# Patient Record
Sex: MALE | Race: WHITE | URBAN - METROPOLITAN AREA
[De-identification: names, ages, dates, MRNs, and addresses within clinical notes are randomized per-mention and may not be internally consistent; named-entity substitution may affect disease eponyms.]

---

## 2018-06-07 ENCOUNTER — BEH TREATMENT PLAN (OUTPATIENT)
Dept: BEHAVIORAL HEALTH | Facility: CLINIC | Age: 43
End: 2018-06-07

## 2018-06-07 ENCOUNTER — HOSPITAL ENCOUNTER (OUTPATIENT)
Dept: BEHAVIORAL HEALTH | Facility: CLINIC | Age: 43
Discharge: HOME | End: 2018-06-07
Attending: SOCIAL WORKER | Admitting: SOCIAL WORKER
Payer: COMMERCIAL

## 2018-06-07 PROCEDURE — 90791 PSYCH DIAGNOSTIC EVALUATION: CPT

## 2018-06-07 ASSESSMENT — ANXIETY QUESTIONNAIRES
1. FEELING NERVOUS, ANXIOUS, OR ON EDGE: SEVERAL DAYS
4. TROUBLE RELAXING: MORE THAN HALF THE DAYS
2. NOT BEING ABLE TO STOP OR CONTROL WORRYING: NOT AT ALL
IF YOU CHECKED OFF ANY PROBLEMS ON THIS QUESTIONNAIRE, HOW DIFFICULT HAVE THESE PROBLEMS MADE IT FOR YOU TO DO YOUR WORK, TAKE CARE OF THINGS AT HOME, OR GET ALONG WITH OTHER PEOPLE: NOT DIFFICULT AT ALL
6. BECOMING EASILY ANNOYED OR IRRITABLE: NOT AT ALL
7. FEELING AFRAID AS IF SOMETHING AWFUL MIGHT HAPPEN: SEVERAL DAYS
3. WORRYING TOO MUCH ABOUT DIFFERENT THINGS: MORE THAN HALF THE DAYS
5. BEING SO RESTLESS THAT IT IS HARD TO SIT STILL: NOT AT ALL
GAD7 TOTAL SCORE: 6

## 2018-06-07 NOTE — PROGRESS NOTES
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 repeated unsuccessful efforts to cut down or stop gambling.  Is often preoccupied with gambling (e.g. having persistent thoughts of reliving past gambling experiences, handicapping or planning the next venture, thinking of ways to get money with which to hat[place]).  After losing money gambling, individual often returned another day to get even. (\"chasing one's losses\")  Lies to conceal the extent of involvement with gambling.  Has jeopardized or lost a significant relationship, job, educational or career opportunity because of gambling.  Relies on others to provide money to relieve desperate financial situations caused by gambling (\"a bailout\")    Specify if:   Episodic:  Meeting diagnostic at more than one time point, with symptoms subsiding between periods of gambling disorder for at least several months.    Persistent:  Experiencing continuous symptoms, to meet diagnostic criteria for multiple years.    Specify if:   In early remission:  After full criteria for alcohol/drug use disorder were previously met, none of the criteria for alcohol/drug use disorder have been met for at least 3 months but for less than 12 months (with the exception that Criterion A4,  Craving or a strong desire or urge to use " alcohol/drug  may be met).     In sustained remission:   After full criteria for alcohol use disorder were previously met, none of the criteria for alcohol/drug use disorder have been met at any time during a period of 12 months or longer (with the exception that Criterion A4,  Craving or strong desire or urge to use alcohol/drug  may be met).   Specify if:   This additional specifier is used if the individual is in an environment where access to alcohol is restricted.    Mild: Presence of 4-5 symptoms    Moderate: Presence of 6-7 symptoms    Severe: Presence of 8 or more symptoms      Summary of Substance Abuse Disorder Symptoms     A problematic pattern of alcohol/drug use leading to clinically significant impairment or distress, as manifested by at least two of the following, occurring within a 12-month period:    NA    Specify if:   In early remission:  After full criteria for alcohol/drug use disorder were previously met, none of the criteria for alcohol/drug use disorder have been met for at least 3 months but for less than 12 months (with the exception that Criterion A4,  Craving or a strong desire or urge to use alcohol/drug  may be met).     In sustained remission:   After full criteria for alcohol use disorder were previously met, none of the criteria for alcohol/drug use disorder have been met at any time during a period of 12 months or longer (with the exception that Criterion A4,  Craving or strong desire or urge to use alcohol/drug  may be met).   Specify if:   This additional specifier is used if the individual is in an environment where access to alcohol is restricted.    Mild: Presence of 2-3 symptoms    Moderate: Presence of 4-5 symptoms    Severe: Presence of 6 or more symptoms    SOGS: 10 DSM-5: 7 CAGE-AID: 0 MARIO-7: 6 PHQ-9: 3     Mental Status Assessment    Physical Appearance/Attire:  Appears stated age  Hygiene:  well groomed  Eye Contact:  at examiner  Speech:  regular  Speech Volume:   regular  Speech Quality: fluid  Cognitive/Perceptual:  reality based  Cognition:  memory intact   Judgment:  intact  Insight:  intact  Orientation:  time, place, person and situation  Thought:  logical   Hallucinations:  none  General Behavioral Tone:  cooperative  Psychomotor Activity:  no problem noted  Gait:  no problem  Mood:  normal  Affect:  congruence/appropriate      Vulnerable Adult Checklist for OUTPATIENTS     1.  Do you have a physical, emotional or mental infirmity or dysfunction?       No    2.  Does this issue impair your ability to provide for your own care without help, including providing yourself with food, shelter, clothing, healthcare or supervision?       No    3.  Because of this issue, I need assistance to protect myself from maltreatment by others.      No    Based on the above information:    This person is not a functional Vulnerable Adult according to [place] Statute 626.5572 subdivision 21.      Category Severity (ICD-10 Code / DSM 5 Code)   Gambling Disorder Severe  (F63.0) (312.31)   Alcohol Use Disorder NA   Cannabis Use Disorder NA   Hallucinogen Use Disorder NA   Inhalant Use Disorder NA   Opioid Use Disorder NA   Sedative, Hypnotic, or Anxiolytic Use Disorder NA   Stimulant Related Disorder NA   Tobacco Use Disorder Severe   (F17.200) (305.1)    Other (or unknown) Substance Use Disorder NA     Suicide Screening Questions:   1. Are you feeling hopeless about the present/future?     No   2. Have you ever had thoughts about taking your life?     No   3. When did you have these thoughts?     NA   4. Do you have any current intent or active desire to take your life?     No   5. Do you have a plan to take your life?     No   6. Have you ever made a suicide attempt?     No   7. Do you have access to pills, guns or other methods to kill yourself?     No     Guide to Risk Ratings   IDEATION: Active thoughts of suicide? INTENT: Intent to follow on suicide? PLAN: Plan to follow through on  "suicide? Level of Risk:   IF Yes Yes Yes Patient = High Emergent   IF Yes Yes No Patient = High Urgent/Non-Emergent   IF Yes No No Patient = Moderate Non-Urgent   IF No No   No Patient = Low Risk   The patient's ADDITIONAL RISK FACTORS and lack of PROTECTIVE FACTORS may increase their overall suicide risk ratings.     Patient's Responses (within the last 30 days)   IDEATION: Active thoughts of suicide?    No     INTENT: Intent to follow on suicide?    No     PLAN: Plan to follow through on suicide?    No     Determining the level of risk depends on the patient responses, suicide risk factors and protective factors.     Additional Risk Factors: A recent loss that was significant to the patient, i.e. loss of job, loss of home, divorce, break-up, etc.  Significant legal problems including being at risk of incarceration   Protective Factors:  Having people in his/her life that would prevent the patient from considering committing suicide (i.e. young children, spouse, parents, etc.)  Having pet(s) who give companionship and/or  would prevent the patient from considering committing suicide  An absence of mental health issues or stable and well treated mental health issues  An absence of chronic health problems or stable and well treated chronic health issues  Having easy access to supportive family members  Having a good community support network  Having cultural, Rastafari or spiritual beliefs that discourage suicide  Having restricted access to highly lethal means of suicide     Risk Status   Emergent? No   Urgent / Non-Emergent? No   Present / Non- Urgent? No    Low Risk? Yes, Evaluation Counselors - Document in Epic / SBAR to counselor \"No identified risk\" and Treatment Counselors - Assess weekly in progress notes under Dimension 3 and summarize in Discharge / Treatment summary under Dimension 3.   Additional information to support suicide risk rating: See Above     Summary of [facility] Placement Criteria:   I.) " "Intoxication and Withdrawal: 0   II.) Biomedical:  0   III.) Emotional and Behavioral:  1   IV.) Readiness to Change:  1   V.) Relapse Potential: 2   VI.) Recovery Environmental: 3     Evaluation Summary and Plan   's Recommendation    Abstain from all forms of gambling, including \"free\" games , and online/gallo games.  Refrain from entering all types of kofi establishments.  Self-ban with the help of a trusted other from all local casinos/card rooms, cut up players cards and have all gambling mail stopped.  Abstain from all mood-altering chemicals unless prescribed by a licensed provider.  Complete the evening outpatient compulsive gambling treatment program at [facility] [address] [facility].   Complete Orientation and begin group on 06/12/18.  Attend GA 12-step, cultural, spiritual, other supportive community meeting on a weekly basis.   Have someone you check in with weekly who will hold you accountable.   Remain law abiding and follow all recommendations of the courts/PO.  Limit, if not abstain from alcohol and all other non-prescribed mood altering chemicals  [facility], complete program, return to [facility].      Initial problem list:    The patient lacks relapse prevention skills  The patient has poor coping skills  The patient has poor refusal skills   The patient has a tendency to isolate  The patient has current legal issues  The patient home is in jeopardy, but they are using long term to pay off back debt.    "

## 2018-06-08 ASSESSMENT — PATIENT HEALTH QUESTIONNAIRE - PHQ9: SUM OF ALL RESPONSES TO PHQ QUESTIONS 1-9: 3

## 2018-06-08 ASSESSMENT — ANXIETY QUESTIONNAIRES: GAD7 TOTAL SCORE: 6

## 2018-06-12 ENCOUNTER — HOSPITAL ENCOUNTER (OUTPATIENT)
Dept: BEHAVIORAL HEALTH | Facility: CLINIC | Age: 43
End: 2018-06-12
Attending: SOCIAL WORKER
Payer: COMMERCIAL

## 2018-06-12 PROCEDURE — 90832 PSYTX W PT 30 MINUTES: CPT

## 2018-06-12 PROCEDURE — 90853 GROUP PSYCHOTHERAPY: CPT

## 2018-06-13 NOTE — PROGRESS NOTES
"6/12/2018 5:30-7:30pm D. Patient participated in his first Intensive Outpatient Problem Gambling group.Patient came in very late and was able to introduce himself to the group.  He stated his work schedule is being changed and he won't be late any longer. Patient's ended up in a discussion of how one group member had a gambling dream and how she had \"thought through\" the process for an hour.  How to handle triggers and cues to decrease chances of relapse were dicussed.  Group member was defensive and felt she had handeld it the right way since she didn't hatfield. Group closed with reading on \"Awe\", DM4.  I. Writer facilitated discussion.  A. Pt seemed to appreciate the discussion of how to handle gambling dreams, urges, and thoughts of gambling. P. Pt. Implement information into recovery and begin working on assignments.     Mora Mak MS, [facility], [facility]  "

## 2018-06-13 NOTE — PROGRESS NOTES
D.  Patient attended Problem Gambling IOP orientation.  Video was watched, assignment notebook was given, and orientation forms were signed. A. Pt understood orientation and motivated to begin treatment.  P.  Begin attending group and  complete assignments.    oMra Mak MS, [facility], [facility]

## 2018-06-18 NOTE — PROGRESS NOTES
[facility]  Adult Gambling Program  Treatment Plan Requirements    These services are provided by the facility for each patient/client according to the individual's treatment plan:    Individual and group counseling    Education    Transition services    Services to address any co-occurring mental illness    Service coordination    Initial Treatment Plan Goals if noted in plan:  1. Complete all the requirements of Program Orientation.  2. Maintain medication compliance throughout the program.  3. Complete gambling therapy for identified issues on your problem list.  4. Gain family involvement in treatment process to address family issues from the problem list.  5. Attend and participate in all required group(s) per individual treatment plan.  6. Focus attention to individualized issues from the treatment plan.  7. Schedule a physical examination if recommended.    In addition to the above, complete all individual goals as specifically outlines on your treatment plan.    Criteria for discharge:  Patients/clients are discharged from the program following completion of the entire program including Phase I and II or acceptance of other post-treatment referrals to mental health providers, or aftercare at other facilities.  Patients/clients may also be discharged for inappropriate behavior, chemical use or gambling.      Favorable Discharge - Patients/clients have completed agreed upon treatment goals, understand their diagnosis and appear motivated about the follow-up care.    Guarded Discharge - Patients/clients have demonstrated some understanding of their diagnosis and recovery process, and have completed some of their treatment goals.  This prognosis also includes patients/clients who have completed some treatment goals but have not made commitment to community support or follow through with referrals.    Unfavorable Discharge - Patients/clients have not completed agreed upon treatment  goals due to their own choice, have limited understanding of their diagnosis, and have shown minimal or inconsistent behavior conducive to recovery.  Those patients/clients discharged due to behavioral problems will also be unfavorable discharges.                                  Adult Gambling Treatment Plan     Name:   Kamlesh Nava  MR#  [id number]  :   1975   43 year old Male    Acute Intoxication/Withdrawal Potential      DIMENSION 1  RISK FACTOR: 0      Assignment Date  Source  Prob/Goal/  Intervention  Target  Date  Initials  Outcome  Completion  Date    2018 Self - Current, History - Current and Assessment - Current   Problem: Pt did not appear to be under the influence or in withdrawal on the date of assessment.  Goal: Limit, if not abstain, from alcohol, and abstain from all other mood altering chemicals due to history of addiction and/or concern of cross addiction.  Intervention: Report to counselor and group any concern or abuse with alcohol or any drug use during treatment.   Handout:  Rethinking Drinking           Ongoing    Orientation SC         Effective    Effective         19     Biomedical Conditions and Complaints      DIMENSION 2  RISK FACTOR: 0       Assignment Date  Source  Prob/Goal/  Intervention  Target  Date  Initials  Outcome  Completion  Date    2018 Self - Current, History - Current and Assessment - Current   Problem: Patient reports no pain or chronic medical conditions.  Goal: Follow recommendations of medical provider.  Intervention: Continue to take prescribed medications and follow-up with medical interventions while in program.        Ongoing  SC       Effective       19     Emotional/Behavioral/Cognitive Conditions and Complications     DIMENSION 3  RISK FACTOR:  1           Assignment Date Source Prob/Goal/  Intervention Target  Date Initials Outcome Completion  Date   2018 Self - Current, History - Current and Assessment -  Current   Problem: Problem: Patient's self-esteem has been negatively impacted due to gambling and its consequences.  Goal: To clarify self-concept.  Recognize how this has affected behaviors and how it can affect recovery.  Intervention:  Handout:  Test of Self-Conscious Affect, Version 3 (TOSCA-3S).  Complete and discuss outcome in group.             In Group SC             Effective             06/28/18 6/18/2018 Self - Current, History - Current and Assessment - Current   Problem: Patient lacks an understanding of gambling as a disease.  Goal:  Gain awareness of gambling as a chronic, progressive, incurable (but treatable) disease.  Intervention:  NAC/Vivitrol/Naltrexone Discussion:  Handout:  Gambling and the  Brain  Movie:  Disease of compulsive gambling           Orientation    In Group SC           Effective    Effective           06/12/18 08/02/18 6/18/2018 Self - Current, History - Current and Assessment - Current   Problem:  Pt reports racing thoughts and brain constantly in action.  Goal:  To learn to self soothe and calm the racing thoughts.  Intervention:  Introduction to Meditation, Mindfulness, yoga, and stress reduction through group meditation, coloring pages, and other coping skills shared in group.  Introduction to yoga, meditation, and mindfulness  Meditation at the beginning of each group session             In group  In group SC             Effective  Effective             02/07/19 02/07/19 6/18/2018 Self - Current, History - Current and Assessment - Current   Problem: Patient struggles with suicidal ideation, past suicide attempts, or is diagnosed with an addiction that has a high rate of suicidal ideation.  Goal:  Increase awareness of signs of growing distress and interventions to decrease suicidal ideation  Complete  Patient Safety Plan   Update  Patient Safety Plan          Intake or   In Group  In Group SC           Effective  Effective           07/22/18 12/12/18 6/18/2018  Self - Current, History - Current and Assessment - Current   Problem: Patient struggles with self-esteem and seeing themselves as worthy of acceptance.  Goal:  Increase acceptance of personality not as a deficit, but that it is who they are and can be accepted as an asset.  Group Work:  Enneagram Type Indicator         Week C SC         Effective         11/29/18     Readiness to Change     DIMENSION 4  RISK FACTOR: 1            Assignment Date Source Prob/Goal/  Intervention Target  Date Initials Outcome Completion  Date   6/18/2018 Self - Current, History - Current and Assessment - Current   Problem: Problem: Lack of understanding the illness of compulsive gambling and life areas impacted.   Goal: To understand the illness of compulsive gambling and to examine all areas affected.   Intervention:  Ongoing: Accept and give feedback to the group on how patient is living a life of recovery and activity/committment to the program.               In Group SC               Effective               10/02/18   6/18/2018 Self - Current, History - Current and Assessment - Current   Problem: Has a combination of internal and external motivation preceding treatment admission.                       Goal:  Increase internal motivation to remain sober.  Be able to utilize external sources on days when it is harder to stay clean for you  Intervention:  Handout:  Setting and Pursuing Goals in Recovery  Ongoing:  Weekly check in sheet               Week C  In group SC               Effective  Effective               08/09/18 02/07/19 6/18/2018 Self - Current, History - Current and Assessment - Current   Problem:  Patient is disconnected from their Humanity, values, and virtues.  Goal:  Decrease negative self-talk, increase sense of purpose in life and their value to society.  Intervention:  Handout:  How to speak the language of Virtues & Virtues list and in group assignment  Group :  Weekly reading on Virtues           In  group    In group SC           Effective    Effective           08/30/18 02/07/19 6/18/2018 Self - Current, History - Current and Assessment - Current   Problem: Poor exercise, diet, and sleep habits.   Goal: Improve physical and psychological health. Interventions: Start an exercise program, Eat 3 nutritional meals daily and Get 6-8 hours of sleep nightly.     Ongoing   SC     Effective       02/07/19       Relapse/Continued Use/Continued Problem Potential     DIMENSION 5  RISK FACTOR: 2               Assignment Date Source Prob/Goal/  Intervention Target  Date Initials Outcome Completion  Date   6/18/2018 Self - Current, History - Current and Assessment - Current   Problem:  Patient has poor relationship with money, large amounts of money gambled, and financial stressors because of gambling.  Goal: Remove all access to money and involvement in financial matters, protect assets.  reduce financial stress and gain insight into money relationship    Intervention:  Handout:  What is your money color?  &  Money Mechanics   Speaker:   Family Means comes to speak     ASAP and ongoing        In Group  In Group SC               Effective  Effective               08/23/18 08/16/18 6/18/2018 Self - Current, History - Current and Assessment - Current   Problem: Patient lacks insight into their personal relapse process.  Goal:  Gain awareness of gambling as a chronic, progressive, incurable (but treatable) disease.  Intervention:  Handout:  What are your strengths and weakness in your recovery plan.  Share with the group.             Week Q SC             Effective             07/19/18 6/18/2018 Self - Current, History - Current and Assessment - Current   Problem:  Patient lacks an understanding of relapse triggers and cues.  Goal:  Gain an understanding of events, places, and other variables can trigger a relapse.  Intervention:  Handout:  Trigger Inventory           Week F SC           Effective           07/12/18      6/18/2018 Self - Current, History - Current and Assessment - Current   Problem:  Patient uses tobacco and alcohol.  Both have shown an increase in relapse if continued use in recovery.  Goal:  Quit drinking and smoking to increase recovery  Intervention:  Create a plan to quit smoking and drinking while in early recovery from problem gambling.     Week T SC         Effective         02/07/19       Recovery Environment     DIMENSION 6  RISK FACTOR: 3                 Assignment Date Source Prob/Goal/  Intervention Target  Date Initials Outcome Completion  Date     6/18/2018 Self - Current, History - Current and Assessment - Current   Problem: Patient lacks a sober support system.  Goal: Develop a strong network of people in recovery, introduction to Gamblers Anonymous, & learn alternatives to GA.  Intervention:  Journal:  Identify GA/support meetings you can attend  Speaker:  GA Member  Handout:  GA book  Movie:  The Anonymous People             In Group  Orientation  In Group SC             Effective  Effective  Effective             08/14/18 06/12/18 07/05/18 6/18/2018 Self - Current, History - Current and Assessment - Current   Problem: Relationships with family/concerned persons have been strained through use and related behaviors  Goal:  Begin healing damaged relationships.  Allow adequate time for healing to occur  Intervention:  Attend Family Group w/wo family           Phase I SC           Effective           02/07/19 6/18/2018 Self - Current, History - Current and Assessment - Current   Problem:  Patient struggles with finding balance in life and handling daily stress.  Goal:  Discover a proper balance between self and life responsibilities  Intervention  Handout:  Life Balance Wheel           Week F SC           Effective           10/11/18   6/18/2018 Self - Current, History - Current and Assessment - Current   Problem:  Patient has history of court involvement.  Goal:  Understand the  correlation between your use and your legal system involvement.    -Remain law abiding so that you may complete probation  Intervention: Satisfy requirements of probation.  Avoid further negative involvement with the criminal justice system.         Ongoing SC         Effective         02/07/19 6/18/2018 Self - Current, History - Current and Assessment - Current   Problem: Patient has suffered the effects of problem gambling  Goal: Complete Treatment with a plan for long term recovery.  Intervention:   Handout:  Planning Aftercare  Continuing Care Plan  for Long Term Recovery       Week V  Discharge SC         Effective         04/11/19       All interventions that are designated as  current  will need to be completed in order to transition out of treatment with a favorable prognosis. The treatment plan is a flexible document and a work in progress. Interventions and goals may be added at any time to customize plan to each individual s needs. Client may work with therapist to change interventions as long as they pertain to the goals stipulated in the plan and/or are clinically driven.

## 2018-06-18 NOTE — PROGRESS NOTES
Acknowledgement of Current Treatment Plan       I have reviewed my treatment plan with my therapist / counselor on __06/19/18________. I agree with the plan as it is written in the electronic health record.    Name Signature   Kamlesh Nava    Name of Therapist / Counselor    Mora Mak MS, [facility], [facility]

## 2018-06-18 NOTE — PROGRESS NOTES
6/18/2018 D) Pt met with Problem Gambling counseling staff to create treatment plan.    Client Treatment goal list:  1. Low - self esteem - been a constant issue for me  2. Truthfulness w/ family -  3. Procrastination  A) Pt signed treatment plan and appeared motivated to begin working on problems identified and discussed. Treatment goals include all Dimensions.   P) Pt to continue in group and working on assignments.  Mora Mak MS, LADC. [facility]

## 2018-06-19 ENCOUNTER — HOSPITAL ENCOUNTER (OUTPATIENT)
Dept: BEHAVIORAL HEALTH | Facility: CLINIC | Age: 43
End: 2018-06-19
Attending: SOCIAL WORKER
Payer: COMMERCIAL

## 2018-06-19 PROCEDURE — 90853 GROUP PSYCHOTHERAPY: CPT

## 2018-06-19 PROCEDURE — 90832 PSYTX W PT 30 MINUTES: CPT

## 2018-06-20 ENCOUNTER — HOSPITAL ENCOUNTER (OUTPATIENT)
Dept: BEHAVIORAL HEALTH | Facility: CLINIC | Age: 43
End: 2018-06-20
Attending: SOCIAL WORKER
Payer: COMMERCIAL

## 2018-06-20 PROCEDURE — 90853 GROUP PSYCHOTHERAPY: CPT

## 2018-06-20 NOTE — PROGRESS NOTES
"6/19/2018 5:30-7:30pm D. Patient participated in the Intensive Outpatient Problem Gambling group. Group participated in a guided meditation, DM3.  Introductions and Check-in were done. Pt shared highs and lows for the week and any struggles with recovery.  Pt shared what brought him to treatment and how the discussion about the other client not being able to have access to money was eye opening for him.  He sees what him and his wife are and will continue to be going through in their relationship and communication. Tuesday group completed the Weekly Group Inventory, DM4, asking about gambling and suicidal ideation during the past week.  Patient did not report suicidal ideation or gambling this week, DM3: No identified risk. Patient did report he is working on better [employer].  Group spent the group processing two issues that came up in check in.  One patient reported gambling and heading to inpatient and one patient who had a fight with his wife over having access to money.  The group members accepted the feedback the group and counselor gave. Group closed with reading on \"Flexibility\", DM4.  I. Writer facilitated discussion.  A. Pt seemed to benefit from hearing others struggles with relationships and communication.  P. Pt. Implement information into recovery and continue working on assignments.    Mora Mak, MS, LADC, ICGC-II  "

## 2018-06-21 ENCOUNTER — HOSPITAL ENCOUNTER (OUTPATIENT)
Dept: BEHAVIORAL HEALTH | Facility: CLINIC | Age: 43
End: 2018-06-21
Attending: SOCIAL WORKER
Payer: COMMERCIAL

## 2018-06-21 PROCEDURE — 90853 GROUP PSYCHOTHERAPY: CPT

## 2018-06-21 NOTE — PROGRESS NOTES
6/20/2018  5:30-7:30  D) Patient attended Family Group, DM6, he had his wife present.  Patient shared that he is grateful that his wife came with him. Group was given a lecture on how the brain changes and addiction and the information was discussed by group.  I) Facilitated group. A)  Patient stated that the information was helpful. Patient seems to be seeking support. P) Invite wife to Family Group.       Abdulaziz Juarez, PAUL

## 2018-06-21 NOTE — PROGRESS NOTES
"6/21/2018 5:30-7:30pm D. Patient participated in the Intensive Outpatient Problem Gambling group. Group participated in a guided meditation, DM3.  Introductions and Check-in were done, new patient started this evening.  Pt shared highs and lows for the week and any struggles with recovery.  Pt shared how he is facing court issues and past felony charges due to his gambling. Group spent the time on check in and helping a group member with her decision to go to inpatient.  New patient also has theft from employer and group shared their stories of their experience thus far with their court process.  Helping to make the newest patient feel welcomed.  Group closed with reading on \"Discernment\", DM4.  I. Writer facilitated discussion.  A. Pt seemed to benefit from sharing their personal stories and hearing the stories of others.  Group is supportive of each other right now and appreciating having the stories of others to reduce their shame. P. Pt. Implement information into recovery and continue working on assignments.     Mora Mak, MS, LADC, ICGC-II  "

## 2018-06-28 ENCOUNTER — HOSPITAL ENCOUNTER (OUTPATIENT)
Dept: BEHAVIORAL HEALTH | Facility: CLINIC | Age: 43
End: 2018-06-28
Attending: SOCIAL WORKER
Payer: COMMERCIAL

## 2018-06-28 PROCEDURE — 90853 GROUP PSYCHOTHERAPY: CPT

## 2018-06-29 NOTE — PROGRESS NOTES
"6/28/2018 5:30-7:30pm D. Patient participated in the Intensive Outpatient Problem Gambling group. Group participated in a guided meditation, DM3.  Introductions and Check-in were done, new group member started this evening.  Pt shared highs and lows for the week and any struggles with recovery.  Pt shared that he lost his job as he believes his step mother reported his past felony to his new employer.  He seemed to be in good spirits about it and is already looking into jobs.  Patient did not have an assignment to present. Group spent the time doing the TOSCA-3S, looking at behaviors that can bring about shame, blame, or guilt.  Discussion was held on the benefit of guilt and how you can make amends quickly rather than sit in your shame. Group closed with reading on \"Integrity\", DM4.  I. Writer facilitated discussion.  A. Pt seemed to gain a better understanding of the difference between guilt, shame, and blame.   P. Pt. Implement information into recovery and continue working on assignments.      Mora Mak MS, LADC, ICGC-II  "

## 2018-07-05 ENCOUNTER — HOSPITAL ENCOUNTER (OUTPATIENT)
Dept: BEHAVIORAL HEALTH | Facility: CLINIC | Age: 43
End: 2018-07-05
Attending: SOCIAL WORKER
Payer: COMMERCIAL

## 2018-07-05 PROCEDURE — 90853 GROUP PSYCHOTHERAPY: CPT

## 2018-07-05 NOTE — PROGRESS NOTES
"7/5/2018 5:30-7:30pm D. Patient participated in the Intensive Outpatient Problem Gambling group. Introductions and Check-in were done.  Pt shared highs and lows for the week and any struggles with recovery.  Group watched the movie \"Anonymous People\" about speaking about addiction in words of recovery.   Small group discussion afterwards.  I. Writer facilitated discussion.  A. Pt seemed to gain an understanding of addiction as a life long disease and options for how to speak about addition and recovery in different terms.  .  P. Pt. Implement information into recovery and continue working on assignments.    Mora Mak, MS, LADC, ICGC-II  "

## 2018-07-11 ENCOUNTER — HOSPITAL ENCOUNTER (OUTPATIENT)
Dept: BEHAVIORAL HEALTH | Facility: CLINIC | Age: 43
End: 2018-07-11
Attending: SOCIAL WORKER
Payer: COMMERCIAL

## 2018-07-11 PROCEDURE — 90853 GROUP PSYCHOTHERAPY: CPT

## 2018-07-12 ENCOUNTER — HOSPITAL ENCOUNTER (OUTPATIENT)
Dept: BEHAVIORAL HEALTH | Facility: CLINIC | Age: 43
End: 2018-07-12
Attending: SOCIAL WORKER
Payer: COMMERCIAL

## 2018-07-12 PROCEDURE — 90853 GROUP PSYCHOTHERAPY: CPT

## 2018-07-12 NOTE — PROGRESS NOTES
7/11/2018  5:30-7:30  D) Patient attended Family Group, DM6, he had his wife present.  Patient shared that their relationship is improving. Group was given a lecture on shame and the information was discussed by group.  I) Facilitated group. A)  Patient stated that the information was helpful. Patient seems to be seeking support. P) Invite wife to Family Group.       Abdulaziz Juarez, PAUL

## 2018-07-12 NOTE — PROGRESS NOTES
"7/12/2018 5:30-7:30pm D. Patient participated in the Intensive Outpatient Problem Gambling group. Group participated in a guided meditation, DM3.  Introductions and Check-in were done.  Pt shared highs and lows for the week and any struggles with recovery.    Patient  completed the Weekly Group Inventory, DM4, asking about gambling and suicidal ideation during the past week.  Patient did not report suicidal ideation or gambling this week, DM3: No identified risk. Patient did report continued job hunting.  Pt presented the assignment \"Trigger Inventory\", DM4.  Patient accepted feedback from peers and group counselor.   Group closed with reading on \"Compassion\", DM4.  I. Writer facilitated discussion.  A. Pt seemed to benefit from hearing assignments and offered feedback.  P. Pt. Implement information into recovery and continue working on assignments.     Mora Mak, MS, [facility], ICGC-II  "

## 2018-07-18 ENCOUNTER — HOSPITAL ENCOUNTER (OUTPATIENT)
Dept: BEHAVIORAL HEALTH | Facility: CLINIC | Age: 43
End: 2018-07-18
Attending: SOCIAL WORKER
Payer: COMMERCIAL

## 2018-07-18 PROCEDURE — 90853 GROUP PSYCHOTHERAPY: CPT

## 2018-07-19 ENCOUNTER — HOSPITAL ENCOUNTER (OUTPATIENT)
Dept: BEHAVIORAL HEALTH | Facility: CLINIC | Age: 43
End: 2018-07-19
Attending: SOCIAL WORKER
Payer: COMMERCIAL

## 2018-07-19 PROCEDURE — 90853 GROUP PSYCHOTHERAPY: CPT

## 2018-07-19 NOTE — PROGRESS NOTES
7/18/2018  5:30-7:30  D) Patient attended Family Group, DM6, he had his wife present.  Patient shared that he got a job. He and his wife are very happy. Group was given a lecture on healthy relationships and the information was discussed by group.  I) Facilitated group. A)  Patient stated that the information was helpful. Patient seems to be seeking support. P) Invite wife to Family Group.       Abdulaziz Juarez, PAUL

## 2018-07-19 NOTE — PROGRESS NOTES
Patient took group off to have dinner with his wife to celebrate his new job.  Used 1 of 2 summer passes.    Mora Mak MS, LADC, ICGC-II

## 2018-07-24 ENCOUNTER — HOSPITAL ENCOUNTER (OUTPATIENT)
Dept: BEHAVIORAL HEALTH | Facility: CLINIC | Age: 43
End: 2018-07-24
Attending: SOCIAL WORKER
Payer: COMMERCIAL

## 2018-07-24 PROCEDURE — 90853 GROUP PSYCHOTHERAPY: CPT

## 2018-07-25 ENCOUNTER — HOSPITAL ENCOUNTER (OUTPATIENT)
Dept: BEHAVIORAL HEALTH | Facility: CLINIC | Age: 43
End: 2018-07-25
Attending: SOCIAL WORKER
Payer: COMMERCIAL

## 2018-07-25 PROCEDURE — 90853 GROUP PSYCHOTHERAPY: CPT

## 2018-07-26 ENCOUNTER — HOSPITAL ENCOUNTER (OUTPATIENT)
Dept: BEHAVIORAL HEALTH | Facility: CLINIC | Age: 43
End: 2018-07-26
Attending: SOCIAL WORKER
Payer: COMMERCIAL

## 2018-07-26 PROCEDURE — 90853 GROUP PSYCHOTHERAPY: CPT

## 2018-07-26 NOTE — PROGRESS NOTES
7/25/2018  5:30-7:30  D) Patient attended Family Group, DM6, he had his wife present.  Patient shared that he is relieved to be working. Group was given a lecture on trust and the information was discussed by group.  I) Facilitated group. A)  Patient stated that the information was helpful. Patient seems to be seeking support. P) Invite wife to Family  Group       Abdulaziz Juarez, PAUL

## 2018-07-27 NOTE — PROGRESS NOTES
"7/24/2018 5:30-7:30pm D. Patient participated in the Intensive Outpatient Problem Gambling group. Group participated in a guided meditation, DM3.  Introductions and Check-in were done, a group member returned from [facility] and the group welcomed her back.  Pt shared highs and lows for the week and any struggles with recovery.  Group completed the \"Enneagram Personality Testing, DM3.  Patient looked personality traits for their number and how we all come with personalities that are different than others.  Knowing that someone else may deal with things differently not due to being wrong, but they see things differently.   Patient did not have an assignment to present but offered feedback to those that presented.   Group closed with reading on \"Confidence\", DM4.  I. Writer facilitated discussion.  A. Pt seemed to be working a plan of recovery, benefiting from giving feedback to peers, and building a strong recovery. P. Pt. Implement information into recovery and continue working on assignments.    Mora Mak, MS, LADC, ICGC-II  "

## 2018-08-01 ENCOUNTER — HOSPITAL ENCOUNTER (OUTPATIENT)
Dept: BEHAVIORAL HEALTH | Facility: CLINIC | Age: 43
End: 2018-08-01
Attending: SOCIAL WORKER
Payer: COMMERCIAL

## 2018-08-01 PROCEDURE — 90853 GROUP PSYCHOTHERAPY: CPT

## 2018-08-02 ENCOUNTER — HOSPITAL ENCOUNTER (OUTPATIENT)
Dept: BEHAVIORAL HEALTH | Facility: CLINIC | Age: 43
End: 2018-08-02
Attending: SOCIAL WORKER
Payer: COMMERCIAL

## 2018-08-02 PROCEDURE — 90853 GROUP PSYCHOTHERAPY: CPT

## 2018-08-02 NOTE — PROGRESS NOTES
8/1/2018  5:30-7:30  D) Patient attended Family Group, DM6, he had  His wife present.  Patient shared that he is happy to be working. Bharati zuniga . Contributing. Group was given a lecture on the signs and symptoms of addiction and the information was discussed by group.  I) Facilitated group. A)  Patient stated that the information was helpful. Patient seems to be seeking support. P) Invite mohan to Family Group.       Abdulaziz Juarez, PAUL

## 2018-08-06 NOTE — PROGRESS NOTES
"8/2/2018 5:30-7:30pm D. Patient participated in the Intensive Outpatient Problem Gambling group. Group participated in a guided meditation, DM3.  Introductions and Check-in were done. Pt shared highs and lows for the week and any struggles with recovery.  Pt shared how life in early recovery is going. Patient completed the Weekly Group Inventory, DM4, asking about gambling and suicidal ideation during the past week.  Patient did not report suicidal ideation or gambling this week, DM3: No identified risk. Patient did report feeling supported by his family and looking forward instead of in the past.  Group watched \"Disease of Compulsive Gambling\", patient stated \"its like they were following me around, at the similarities in his story and the [place] chart being described in the video.  Patients were given a copy of the \"A Chart of Compulsive Gambling and Recovery\" and discussed it briefly after the video.   I. Writer facilitated discussion.  A. Pt seemed to gain in his understanding of compulsive gambling as a disease.  P. Pt. Implement information into recovery and continue working on assignments.     Mora Mak, MS, LADC, ICGC-II  "

## 2018-08-07 ENCOUNTER — HOSPITAL ENCOUNTER (OUTPATIENT)
Dept: BEHAVIORAL HEALTH | Facility: CLINIC | Age: 43
End: 2018-08-07
Attending: SOCIAL WORKER
Payer: COMMERCIAL

## 2018-08-07 PROCEDURE — 90853 GROUP PSYCHOTHERAPY: CPT

## 2018-08-07 NOTE — PROGRESS NOTES
"8/7/2018 5:30-7:30pm D. Patient participated in the Intensive Outpatient Problem Gambling group. Group participated in a guided meditation, DM3.  Introductions and Check-in were done. A group member who was in the program in the spring, but hadn't completed the program, shared how he had been in the program, went to inpatient, then [facility], then gambled for two months.  He shared how his relapse happened long before he gambled as a way to explain to the group how delicate recovery can be.  This patient then shared highs and lows for the week and any struggles with recovery.    Tuesday group completed the Weekly Group Inventory, DM4, asking about gambling and suicidal ideation during the past week.  Patient did not report suicidal ideation or gambling this week, DM3: No identified risk.  Group was read a handout on \"Adulting\" and talked about adulting and how it is a main component of recovery.  Not all of the group members, but most struggle to be responsible for themselves, let alone for the partners or children. Patient was able to see how his life is different in recovery and that he now understands what adulting looks like and he is feeling better about himself.  Group coined out a group member, many sharing what is needed to maintain recovery, while she was a well liked group member, it did not seem that she had actually worked on herself, possibly only changing addictions to shopping and the group seemed aware of that.  Group closed with reading on \"Humanity\", DM4.  I. Writer facilitated discussion.  A. Pt seemed to appreciate the patient that was coining out, and gave thoughtful consideration to adulting.  P. Pt. Implement information into recovery and continue working on assignments.       Mora Mak, MS, [facility], ICGC-II  "

## 2018-08-08 ENCOUNTER — HOSPITAL ENCOUNTER (OUTPATIENT)
Dept: BEHAVIORAL HEALTH | Facility: CLINIC | Age: 43
End: 2018-08-08
Attending: SOCIAL WORKER
Payer: COMMERCIAL

## 2018-08-08 PROCEDURE — 90853 GROUP PSYCHOTHERAPY: CPT

## 2018-08-08 NOTE — PROGRESS NOTES
8/8/2018  5:30-7:30  D) Patient attended Family Group, DM6, he had his wife present.  Patient shared that he has not felt the need to go to meetings or use his phone list.  [place] feedback that he needs to practice his recovery behaviors before he needs them. Group was given a lecture on bonding andconnection and the information was discussed by group.  I) Facilitated group. A)  Patient stated that the information was helpful. Patient seems to be seeking support. P) Invite wife to Family Group.     PAUL Steve

## 2018-08-09 ENCOUNTER — HOSPITAL ENCOUNTER (OUTPATIENT)
Dept: BEHAVIORAL HEALTH | Facility: CLINIC | Age: 43
End: 2018-08-09
Attending: SOCIAL WORKER
Payer: COMMERCIAL

## 2018-08-09 PROCEDURE — 90853 GROUP PSYCHOTHERAPY: CPT

## 2018-08-10 NOTE — PROGRESS NOTES
"8/9/2018 5:30-7:30pm D. Patient participated in the Intensive Outpatient Problem Gambling group. Group participated in a guided meditation, DM3.  Introductions and Check-in were done.  Pt shared highs and lows for the week and any struggles with recovery.    Pt presented the assignment \"Setting and pursuing Goals in Recovery\", sharing how he is changing his view on money, exercising regularly, and procrastination. Patient accepted and  received feedback from peers and counselor. Conversation was held on Facebook and phone time at night, Value of money, stages of change, and  Accelerated Resolution Therapy.  Group closed with reading on \"Steadfastness\", DM4.  I. Writer facilitated discussion.  A. Pt seemed to gain from the discussions on stages of change, setting SMART goals, and understanding the value of money.  P. Pt. Implement information into recovery and continue working on assignments.     Mora Mak, MS, LADC, ICGC-II  "

## 2018-08-14 ENCOUNTER — HOSPITAL ENCOUNTER (OUTPATIENT)
Dept: BEHAVIORAL HEALTH | Facility: CLINIC | Age: 43
End: 2018-08-14
Attending: SOCIAL WORKER
Payer: COMMERCIAL

## 2018-08-14 PROCEDURE — 90853 GROUP PSYCHOTHERAPY: CPT

## 2018-08-14 NOTE — PROGRESS NOTES
"8/14/2018 5:30-7:30pm D. Patient participated in the Intensive Outpatient Problem Gambling group. Group participated in a guided meditation, DM3.  Group did quick introductions as Giancarlo a speaker from [employer] was there to tell his story of recovery.  Full introductions and Check-in were done, new group member started this evening.  Pt shared highs and lows for the week and any struggles with recovery.  Pt shared  About his upcoming court and a story about his wife. Tuesday group completed the Weekly Group Inventory, DM4, asking about gambling and suicidal ideation during the past week.  Patient did not report suicidal ideation or gambling this week, DM3: No identified risk.  The remainder of the group was spent with check in as there was a relapse, some returning group members, a new court case, and another consequence due to court.  Group listened and supported group members with their difficulties. Group closed with reading on \"Righteousness\", DM4.  I. Writer facilitated discussion.  A. Pt seemed to benefit from sharing their story and hearing the stories from other group members. P. Pt. Implement information into recovery and continue working on assignments. Group was reminded to try to check in and support each other.     Mora Mak, MS, [facility], ICGC-II  "

## 2018-08-15 ENCOUNTER — HOSPITAL ENCOUNTER (OUTPATIENT)
Dept: BEHAVIORAL HEALTH | Facility: CLINIC | Age: 43
End: 2018-08-15
Attending: SOCIAL WORKER
Payer: COMMERCIAL

## 2018-08-15 PROCEDURE — 90853 GROUP PSYCHOTHERAPY: CPT

## 2018-08-15 NOTE — PROGRESS NOTES
Emailed letter to patient at his request documenting his progression in IOP.    Mora Mak MS, LADC, ICGC-II

## 2018-08-15 NOTE — LETTER
[address] [address]      8/15/2018      Kamlesh Nava  [address] [place] [id number]      Dear Mr. Nava    This is to verify that Kamlesh Nava (1975) was here for an evaluation on 06/07/2018.    Recommendation:  Intensive Outpatient Problem Gambling Program     Mr. Nava completed the assessment on 06/07/2018.  He was recommended to attend the  Intensive Outpatient Problem Gambling Group Program at [facility].  Mr. Nava attended orientation and began Phase I of the [facility] Treatment for Problem Gambling on 06/12/18.   is nearing the end of Phase I and will continue into Phase II.    Phase I, 24 sessions:  Tuesday, Wednesday, & Thursday 5:30pm - 7:30pm  Phase II, 20 sessions:  Tuesday & Thursday 5:30pm - 7:30pm    Mr. Nava has been an active and responsible group member.  He has missed a few groups since beginning the program, mainly due to job training.  Mr. Nava and his wife are both involved in the family program on Wednesday evenings.  He has a treatment plan listing the goals and assignments needed to complete the program with staff approval, he is working on those assignments and presenting them in group.    If we can be of further service, please don't hesitate to call.    Sincerely,        Mora Mak MS, [facility], ICGC-II  State Approved Problem Gambling Treatment Provider  [phone]

## 2018-08-16 ENCOUNTER — HOSPITAL ENCOUNTER (OUTPATIENT)
Dept: BEHAVIORAL HEALTH | Facility: CLINIC | Age: 43
End: 2018-08-16
Attending: SOCIAL WORKER
Payer: COMMERCIAL

## 2018-08-16 PROCEDURE — 90853 GROUP PSYCHOTHERAPY: CPT

## 2018-08-16 NOTE — PROGRESS NOTES
8/15/2018  5:30-7:30  D) Patient attended Family Group, DM6, he had his wife present.  Patient shared that everything continues to go well. Group was given a lecture on the brain and the information was discussed by group.  I) Facilitated group. A)  Patient stated that the information was helpful. Patient seems to be seeking support. P) Invite wife to Family Group.       Abdulaziz Juarez, PAUL

## 2018-08-17 NOTE — PROGRESS NOTES
"8/16/2018 5:30-7:30pm D. Patient participated in the Intensive Outpatient Problem Gambling group.  Group was given the opportunity to invite family to watch the movie \"Big Abhinav\", on the progression of problem gambling, followed by a presentation by \"[employer]\", on credit counseling and the programs they offer.  Patient had his wife Minoo present. Family members were given a list of behaviors that were presented in the movie that they may recognize in their loved ones.  After the movie brief introductions and Check-in were done.  Everyone shared highs and lows for the week.  Discussion was held after the movie and then everyone went back to the group room to meet with [employer].   KAYY Writer facilitated discussion.  A. Patient and family member seemed to benefit from watching the movie as well as listening to [employer].  Patients were able to stay after and discuss specifics with the presenter.  Patients were given out additional parking passes. P. Pt. Implement information into recovery and continue working on assignments.     Mora Mak MS, LADC, ICGC-II  "

## 2018-08-23 ENCOUNTER — HOSPITAL ENCOUNTER (OUTPATIENT)
Dept: BEHAVIORAL HEALTH | Facility: CLINIC | Age: 43
End: 2018-08-23
Attending: SOCIAL WORKER
Payer: COMMERCIAL

## 2018-08-23 PROCEDURE — 90853 GROUP PSYCHOTHERAPY: CPT

## 2018-08-23 NOTE — PROGRESS NOTES
"8/23/2018 5:30-7:30pm D. Patient participated in the Intensive Outpatient Problem Gambling group. Group participated in a guided meditation, DM3.  Introductions and Check-in were done, new group member started this evening.  Pt shared highs and lows for the week and any struggles with recovery.  Pt shared that court was continued until after he finishes treatment, he had a day off on Friday and had urges for the first time.  He was going to present relapse prevention assignment, but it was suggested that he redo it now that he has had an urge.  He is also setting up plans for his Fridays home from work by himself.  Drinking was again discussed as he continues to drink, his father was an alcoholic, and his wife is concerned.  Patient wasn't present Tuesday so he completed the Weekly Group Inventory, DM4, asking about gambling and suicidal ideation during the past week.  Patient did not report suicidal ideation or gambling this week, DM3: No identified risk.  Some group members had assignments to present but they were not complete.  Group instead worked on \"The Color of Money\", asking simple questions to see how they view money.  The group was split between Red and Blue.  Reds are seen as having good financial skills, but not always\"check on things once they are in place\".  Blues see money as for \"pampering or treating self\".  Patient was a Blue.  There was a second section on \"Money Mechanics\" about talking within families about money.  Some of the patients took an extra copy home to discuss with partners.  Group closed with reading on \"Self Discipline\", DM4.  I. Writer facilitated discussion.  A. Pt seemed benefit from looking at their relationship with money.   P. Pt. Implement information into recovery and continue working on assignments.     Mora Mak, MS, [facility], ICGC-II  "

## 2018-08-30 ENCOUNTER — HOSPITAL ENCOUNTER (OUTPATIENT)
Dept: BEHAVIORAL HEALTH | Facility: CLINIC | Age: 43
End: 2018-08-30
Attending: SOCIAL WORKER
Payer: COMMERCIAL

## 2018-08-30 PROCEDURE — 90853 GROUP PSYCHOTHERAPY: CPT

## 2018-08-31 NOTE — PROGRESS NOTES
"8/30/2018 5:30-7:30pm D. Patient participated in the Intensive Outpatient Problem Gambling group. Group participated in a guided meditation, DM3.  Introductions and Check-in were done. Pt shared highs and lows for the week and any struggles with recovery.  Pt shared how he came into a lot of cash and how he didn't think about gambling and immediately called his wife about it.  She wanted proof of the amount received, but he couldn't do that and she understood and he understood her questioning him.  Group member missed Tuesday group so completed the Weekly Group Inventory, DM4, asking about gambling and suicidal ideation during the past week.  Patient did not report suicidal ideation or gambling this week, DM3: No identified risk. Patient did report his financial issues.  Patient did not have an assignment to present but offered feedback to group members who presented their assignments.  Group went over the \"Virtues Project\" talking about working towards strengthening our virtues rather than looking at our character defects.  Group closed with reading on \"Joyfulness\", DM4.  I. Writer facilitated discussion.  A. Pt seemed to appreciate looking at increasing the positive (virtues) versus decreasing our negatives (character defects). P. Pt. Implement information into recovery and continue working on assignments.     Mora Mak MS, LADC, ICGC-II  "

## 2018-09-11 ENCOUNTER — HOSPITAL ENCOUNTER (OUTPATIENT)
Dept: BEHAVIORAL HEALTH | Facility: CLINIC | Age: 43
End: 2018-09-11
Attending: SOCIAL WORKER
Payer: COMMERCIAL

## 2018-09-11 PROCEDURE — 90853 GROUP PSYCHOTHERAPY: CPT

## 2018-09-12 NOTE — PROGRESS NOTES
"9/11/2018 5:30-7:30pm D. Patient participated in the Intensive Outpatient Problem Gambling group. Group participated in a guided meditation, DM3.  Introductions and Check-in were done, new group member started this evening.  Pt shared highs and lows for the week and any struggles with recovery.  Pt shared constant stress from his wife who is upset about his mothers visit. Speaker from [place] did not show as expected.   Tuesday group completed the Weekly Group Inventory, DM4, asking about gambling and suicidal ideation during the past week.  Patient did not report suicidal ideation or gambling this week, DM3: No identified risk. Patient did report urges to [place] while watching football.  Patient did not have an assignment ready to present.  Patient did give feedback on one presented assignment. Group closed with reading on \"Tact\", DM4.  I. Writer facilitated discussion.  A. Pt seemed to gain understanding in relapse prevention, how to strengthen recovery, and moderation. P. Pt. Implement information into recovery and continue working on assignments.     Mora Mak MS, [facility], ICGC-II  "

## 2018-09-13 ENCOUNTER — HOSPITAL ENCOUNTER (OUTPATIENT)
Dept: BEHAVIORAL HEALTH | Facility: CLINIC | Age: 43
End: 2018-09-13
Attending: SOCIAL WORKER
Payer: COMMERCIAL

## 2018-09-13 PROCEDURE — 90853 GROUP PSYCHOTHERAPY: CPT

## 2018-09-13 NOTE — PROGRESS NOTES
"9/13/2018 5:30-7:30pm D. Patient participated in the Intensive Outpatient Problem Gambling group. Group participated in a guided meditation, DM3.  Introductions and Check-in were done.  Pt shared highs and lows for the week and any struggles with recovery.  Pt shared that he finally caught up on his mortgage and he was proud at how he is doing financially.  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  Patient did not have an assignment to present but offered feedback to another group member who presented his assignment.  Discussion on trying to control cigarette smoking doesn't work just as trying to control gambling didn't work. Group wrote Thank You cards to the [employer] to thank them for the parking passes.  They were given the option to take extra cards and send a card to someone who has helped them with their treatment. Group member completing did not want to have a chance to hear from his group members, but one woman did sing a song for him.  Group closed with reading on \"Contentment\", DM4.  I. Writer facilitated discussion.  A. Pt seemed to appreciate the chance to send a thank you card, and learned more about creating goals in recovery. P. Pt. Implement information into recovery and continue working on assignments.     Mora Mak MS, [facility], ICGC-II  "

## 2018-09-20 ENCOUNTER — HOSPITAL ENCOUNTER (OUTPATIENT)
Dept: BEHAVIORAL HEALTH | Facility: CLINIC | Age: 43
End: 2018-09-20
Attending: SOCIAL WORKER
Payer: COMMERCIAL

## 2018-09-20 PROCEDURE — 90853 GROUP PSYCHOTHERAPY: CPT

## 2018-09-20 NOTE — PROGRESS NOTES
"9/20/2018 5:30-7:30pm D. Patient participated in the Intensive Outpatient Problem Gambling group. Group participated in a guided meditation, DM3.  Introductions and Check-in were done, new group member started this evening.  Pt shared highs and lows for the week and any struggles with recovery.  Pt shared continuing struggles with his wife. Patient  completed the Weekly Group Inventory, DM4, asking about gambling and suicidal ideation during the past week.  Patient did not report suicidal ideation or gambling this week, DM3: No identified risk. Patient did report sharing with his co-worker about his gambling addiction to explain why he couldn't be involved in the football pool.  Patient did not have an assignment to present but engaged in conversation regarding one patient's assignment.  Group spent time discussing the difference between changing a story and lying.    Group closed with reading on \"Generosity\", DM4.  I. Writer facilitated discussion.  A. Pt seemed frustrated by presenter over his resistance to see how his story was changing the longer he shared with group.  Patient was able to relate to how their own stories changed when they look back at their time gambling.  P. Pt. Implement information into recovery and continue working on assignments. Consider GA and GamAnon.     Mora Mak, MS, LADC, ICGC-II  "

## 2018-10-04 ENCOUNTER — HOSPITAL ENCOUNTER (OUTPATIENT)
Dept: BEHAVIORAL HEALTH | Facility: CLINIC | Age: 43
End: 2018-10-04
Attending: SOCIAL WORKER
Payer: COMMERCIAL

## 2018-10-04 PROCEDURE — 90853 GROUP PSYCHOTHERAPY: CPT

## 2018-10-05 NOTE — PROGRESS NOTES
"10/4/2018 5:30-7:30pm D. Patient participated in the Intensive Outpatient Problem Gambling group. Group participated in a guided meditation, DM3.  Introductions and Check-in were done, new group member started this evening.  Pt shared highs and lows for the week and any struggles with recovery.  Pt shared that he continues to struggle with his relationship and was recommended to contact Darron Alvares for marriage counseling as he works with problem gamblers.  Patient wasn't present for Tuesday's group so he completed the Weekly Group Inventory, DM4, asking about gambling and suicidal ideation during the past week.  Patient did not report suicidal ideation or gambling this week, DM3: No identified risk. Patient did report being pressured with football pools at work.  Group continued the conversation from the prior group on how they feel they are doing (on a scale of 1-10) towards their recovery and how the group feels they are doing.  Patient shared their rating and what they are doing and what they would need to do to bring them up one level higher.  Patient shared he is trying to get to group more consistantly.  Group closed with reading on \"Cleanliness\", DM4.  I. Writer facilitated discussion.  A. Pt seemed to benefit from the group conversation and sharing with group members.  Patient increased awareness of behaviors that are supportive of recovery and a chance to look at the areas they could strengthen. P. Pt. Implement information into recovery and continue working on assignments.    Mora Mak MS, [facility], ICGC-II  "

## 2018-10-11 ENCOUNTER — HOSPITAL ENCOUNTER (OUTPATIENT)
Dept: BEHAVIORAL HEALTH | Facility: CLINIC | Age: 43
End: 2018-10-11
Attending: SOCIAL WORKER
Payer: COMMERCIAL

## 2018-10-11 PROCEDURE — 90853 GROUP PSYCHOTHERAPY: CPT

## 2018-10-11 NOTE — PROGRESS NOTES
"10/11/2018 5:30-7:30pm D. Patient participated in the Intensive Outpatient Problem Gambling group. Group participated in a guided meditation, DM3.  Introductions and Check-in were done. Pt shared highs and lows for the week and any struggles with recovery.  Pt shared that he is going to visit family for a few days. Patient was absent for Tuesdays group so completed the Weekly Group Inventory, DM4, asking about gambling and suicidal ideation during the past week.  Patient did not report suicidal ideation or gambling this week, DM3: No identified risk. Patient did report concerns about finances.  Group completed \"The Life Balance Wheel\" and then shared where they were strong and where they were lacking balance.  Discussion was held on the importance of balance and what they could be doing to bring more balance.  It was agreed that both finance and recovery will be off balance for the first year, but group did agree that they are doing better than they were two months ago.   Group closed with reading on \"Sacrifice\", DM4.  I. Writer facilitated discussion.  A. Pt seemed to benefit from the conversation on balance.   P. Pt. Implement information into recovery and continue working on assignments.  Complete the life balance homework packet.     Mora Mak, MS, [facility], ICGC-II  "

## 2018-10-19 NOTE — PROGRESS NOTES
Patient requested a letter for the court be drafted and emailed to his , release on file.  Letter and release emailed to patient and  using secure email.    Mora Mak MS, [facility], ICGC-II

## 2018-10-19 NOTE — LETTER
[address] [address]      10/19/2018       To Whom It May Concern:    This is to verify that Mr. Kamlesh Nava, : 1975, came to [facility] for a Problem Gambling Assessment.    Mr. Nava completed the assessment on 18.  The recommendation was for him to attend the Intensive Outpatient Problem Gambling Group Program at [facility].  On 18 he completed orientation and began attending group that evening.  Mr. Nava has completed 24 of 24 Phase I group sessions and 4 of 20 Phase II group sessions as of the day of this letter.    Phase I, 24 sessions:  Tuesday, Wednesday, & Thursday 5:30pm - 7:30pm  Phase II, 20 sessions:  Tuesday & Thursday 5:30pm - 7:30pm    Mr. Nava and his wife were both involved in the family program on Wednesday evenings.  He has been an active member of group sharing assignments and accepting feedback.  He has completed many of his treatment goals and at this time the plan is to discharge him with a favorable outcome after he completes the last 16 sessions of Phase II.    If we can be of further service, please don't hesitate to contact me.    Sincerely,        Mora Mak MS, [facility], ICGC-II  [facility] Approved Problem Gambling Treatment Provider  [phone]

## 2018-11-08 ENCOUNTER — HOSPITAL ENCOUNTER (OUTPATIENT)
Dept: BEHAVIORAL HEALTH | Facility: CLINIC | Age: 43
End: 2018-11-08
Attending: SOCIAL WORKER
Payer: COMMERCIAL

## 2018-11-08 PROCEDURE — 90853 GROUP PSYCHOTHERAPY: CPT

## 2018-11-09 NOTE — PROGRESS NOTES
"11/8/2018 5:30-7:30pm D. Patient participated in the Intensive Outpatient Problem Gambling group. Group participated in a guided meditation, DM3.  Introductions and Check-in were done.   Pt shared highs and lows for the week and any struggles with recovery.  Pt shared that things are going well with wife as long as he does not work too many hours. Patient's who missed Tuesday group completed the Weekly Group Inventory, DM4, asking about gambling and suicidal ideation during the past week.  Patient did not report suicidal ideation or gambling this week, DM3: No identified risk. Patient did report that he continues to attend GA and contact his GA sponsor consistently.  Patient did not have an assignment to present but offered feedback on other's assignments that were presented.   Group closed with reading on \"Steadfastness\", DM4.  I. Writer facilitated discussion.  A. Pt seemed to benefit and gain in understanding about triggers in early and later recovery. P. Pt. Implement information into recovery and continue working on assignments.     Mora Mak MS, LADC, ICGC-II  "

## 2018-11-29 ENCOUNTER — HOSPITAL ENCOUNTER (OUTPATIENT)
Dept: BEHAVIORAL HEALTH | Facility: CLINIC | Age: 43
End: 2018-11-29
Attending: SOCIAL WORKER
Payer: COMMERCIAL

## 2018-11-29 PROCEDURE — 90853 GROUP PSYCHOTHERAPY: CPT

## 2018-11-29 NOTE — PROGRESS NOTES
11/29/2018 Weekly Staffing Note with Supervisor PAUL Rodas, LICSW.  Met for case consult on patient.  Discussed new and ongoing concerns and progress in treatment.  Patient is struggling with attendance, issue was addressed and ideas for change in treatment planning was discussed.  Plan is to talk to patient for commitment or recommendation to other programming.  Patient will be staffed with Family Counselor this afternoon.    Mora Mak MS, PAUL, ICGC-II

## 2018-11-30 NOTE — PROGRESS NOTES
"11/29/2018 5:30-7:30pm D. Patient participated in the Intensive Outpatient Problem Gambling group. Group participated in a guided meditation, DM3.  Introductions and Check-in were done.  Pt shared highs and lows for the week and any struggles with recovery.  Pt shared problems with neighbors. Patient completed the Weekly Group Inventory, DM4, asking about gambling and suicidal ideation during the past week.  Patient did not report suicidal ideation or gambling this week, DM3: No identified risk. Patient did report being in contact with his sponsor.  Group completed the \"Enneagram Type Indicator Sample\" looking at how our personalities are not wrong or better, but just to try to be the best person we can be.  This helps with increasing self-esteem and in close relationships with others.  Group closed with reading on \"Ebony\", DM4.  I. Writer facilitated discussion.  A. Pt seemed to benefit from learning their type and how there are strong traits that are beneficial to their life. P. Pt. Implement information into recovery and continue working on assignments.     Mora Mak MS, LADC, ICGC-II  "

## 2018-12-10 PROBLEM — F63.0 PATHOLOGICAL GAMBLING: Status: ACTIVE | Noted: 2018-12-10

## 2018-12-27 ENCOUNTER — HOSPITAL ENCOUNTER (OUTPATIENT)
Dept: BEHAVIORAL HEALTH | Facility: CLINIC | Age: 43
End: 2018-12-27
Attending: SOCIAL WORKER
Payer: COMMERCIAL

## 2018-12-27 PROCEDURE — 90853 GROUP PSYCHOTHERAPY: CPT

## 2018-12-28 NOTE — PROGRESS NOTES
"12/27/2018 5:30-7:30pm D. Patient participated in the Intensive Outpatient Problem Gambling group. Group participated in a guided meditation, DM3.  Introductions and Check-in were done, new group members started this evening.  Pt shared highs and lows for the week and any struggles with recovery.  Pt shared having been to court and now just having restitution and probation, with no USP time. Patient will generally only come on Thursdays as he is struggling to get out of work on Tuesdays, he will also be encouraged to attend family with his wife, as she seems to benefit from the family group. Group completed the Weekly Group Inventory, DM4, asking about gambling and suicidal ideation during the past week.  Patient did not report suicidal ideation or gambling this week, DM3: No identified risk. Patient did report going to his mothers for the holiday.  Group used entire group time for check in and patient was given a \"Patient Safety Plan\" to complete. There was much discussion on the benefits of Naltrexone and yet there is resistance from many group members to try it. Group closed with reading on \"kindness\", DM4.  I. Writer facilitated discussion.  A. Pt seemed to be seeking support from peers. P. Pt. Implement information into recovery and continue working on assignments.     Mora Mak MS, LADC, ICGC-II  "

## 2019-01-03 ENCOUNTER — HOSPITAL ENCOUNTER (OUTPATIENT)
Dept: BEHAVIORAL HEALTH | Facility: CLINIC | Age: 44
End: 2019-01-03
Attending: SOCIAL WORKER
Payer: COMMERCIAL

## 2019-01-03 PROCEDURE — 90853 GROUP PSYCHOTHERAPY: CPT

## 2019-01-04 NOTE — PROGRESS NOTES
"1/3/2019 5:30-7:30pm D. Patient participated in the Intensive Outpatient Problem Gambling group. Group participated in a guided meditation, DM3.  Introductions and Check-in were done. Pt shared highs and lows for the week and any struggles with recovery.  Pt shared that he father had reached out to him after 8 years. Due to the holiday, there was no group on Tuesday so the group completed the Weekly Group Inventory, DM4, asking about gambling and suicidal ideation during the past week.  Patient did not report suicidal ideation or gambling this week, DM3: No identified risk. Patient did report being offered a promotion but may not take it as is it is more stress.  Patient did not have an assignment to present.  Group spent the time checking in and sharing how things are going regarding issues that arose over the holidays. Group closed with reading on \"Empathy\", DM4.  I. Counselor taught coping skills and educated patients on strategies to increase recovery,DM 3, and facilitated discussion. Patient encouraged him and his wife to attend the GA/GamAnon on Friday nights where many group members attend.  The couple is very isolated.  A. Pt seemed to benefit from the discussion and offered feedback to peers who were struggling.  P. Pt. Implement information into recovery and continue working on assignments. Ongoing assignments will be presented in the following group sessions.     Mora Mak MS, LADC, ICGC-II  "

## 2019-01-17 ENCOUNTER — HOSPITAL ENCOUNTER (OUTPATIENT)
Dept: BEHAVIORAL HEALTH | Facility: CLINIC | Age: 44
End: 2019-01-17
Attending: SOCIAL WORKER
Payer: COMMERCIAL

## 2019-01-17 PROCEDURE — 90853 GROUP PSYCHOTHERAPY: CPT

## 2019-01-17 NOTE — LETTER
[address] [place] [address]      1/21/2019      Kamlesh Nava  [address] [place] [id number]      To Whom It May Concern:    This is to verify that Mr. Nava came to the [facility] for a Problem Gambling Assessment on 06/07/18.    Recommendation:  Intensive Outpatient Problem Gambling Program     Mr. Nava completed the assessment on 06/07/18.  He was recommended to attend the Intensive Outpatient Problem Gambling Group Program at [facility].   As of the day of this letter. Mr. Nava has completed 24 of 24 sessions of Phase I and 9/20 of Phase II.  Patient has shown interested in continuing in the peer led phase III group.    Phase I, 24 sessions:  Tuesday, Wednesday, & Thursday 5:30pm - 7:30pm  Phase II, 20 sessions:  Tuesday & Thursday 5:30pm - 7:30pm  Patients are able to stay for Phase III, if patient and counselor agree it would continue to benefit long term recovery.    Mr. Nava and his wife were both involved in the family program on Wednesday evenings.  He has struggled to attend group consistently due to his work schedule, but when present is an active member of the group.  Patient is working on assignments towards his treatment goals.    If we can be of further service, please don't hesitate to call.    Sincerely,        Mora Mak, MS, [facility], ICGC-II  State Approved Problem Gambling Treatment Provider  [phone]

## 2019-01-18 NOTE — PROGRESS NOTES
5:30:30 -7:30PM D. Pt. participated in the Intensive Outpatient Problem Gambling group. Patient participated in a guided meditation. Introductions and Check-in were done. Pt. Shared highs and lows for the week and any struggles with recovery. .Pt. did not report suicidal ideation or gambling this week. I. Writer facilitated discussion. A. Pt.seemed to be working on a plan of recovery. P. Pt. To implement information into recovery and continue working on assignments. Nate Mistry [facility], [facility]

## 2019-02-07 ENCOUNTER — HOSPITAL ENCOUNTER (OUTPATIENT)
Dept: BEHAVIORAL HEALTH | Facility: CLINIC | Age: 44
End: 2019-02-07
Attending: SOCIAL WORKER
Payer: COMMERCIAL

## 2019-02-07 PROCEDURE — 90853 GROUP PSYCHOTHERAPY: CPT

## 2019-02-08 NOTE — PROGRESS NOTES
"2/7/2019 5:30-7:30pm D. Patient participated in the Intensive Outpatient Problem Gambling group. Group participated in a guided meditation, DM3.  Introductions and Check-in were done,.  Pt shared highs and lows for the week and any struggles with recovery.  Pt shared that he is going on a vacation with his wife and has enjoyed the planning. Patient was able to share his story of long term recovery and offer support to the new members. Tuesday group completed the Weekly Group Inventory, DM4, asking about gambling and suicidal ideation during the past week.  Patient did not report suicidal ideation or gambling this week, DM3: No identified risk. Patient did report continued worries about finances.  Patient gave feedback on others presenting their assignments on triggers.   Group closed with reading on \"Service\", DM4.  I. Counselor taught coping skills and educated patients on Triggers and coping mechanisms,DM 3, and facilitated discussion.  A. Pt seemed to gain in knowledge of triggers.  P. Pt. Implement information into recovery and continue working on assignments.     Mora Mak MS, [facility], ICGC-II      "

## 2019-04-18 ENCOUNTER — HOSPITAL ENCOUNTER (OUTPATIENT)
Dept: BEHAVIORAL HEALTH | Facility: CLINIC | Age: 44
End: 2019-04-18
Attending: SOCIAL WORKER
Payer: COMMERCIAL

## 2019-04-18 PROCEDURE — 90853 GROUP PSYCHOTHERAPY: CPT

## 2019-05-14 NOTE — DISCHARGE SUMMARY
PROBLEM GAMBLING DISCHARGE SUMMARY    NAME:   Kamlesh Nava    MRN#  [id number]    YOB: 1975    PROGRAM:  Evening Intensive Outpatient Compulsive Gambling Treatment Program    ADMISSION DATE:  19  DISCHARGE DATE:  19    ADMISSION DIAGNOSIS:  Compulsive Gambling 312.31, Severe  DISCHARGE DIAGNOSIS:  Compulsive Gambling 312.31, Sustained remission    DISCHARGE STATUS:    Mr. Kamlesh Nava was discharged from the compulsive gambling program at the [facility] on 19 with a favorable discharge. Mr. Nava completed Phase I and Phase II.  Mr. Nava has completed agreed upon treatment goals, understands his diagnosis, and appears motivated about the aftercare plan.       PRESENTING INFORMATION:   Patient presented to Intensive Outpatient Problem Gambling treatment at [facility]  due to having court contact and a recommendation to have a problem gambling assessment.  Patient has long history of theft by [employer] to afford his gambling addiction.      Last Bet:  2017.  Went with $1000, [employer], 4-5 hours, left after losing a couple hundred.  Had hidden all the court issues from his wife, finally took off and left the country for a week, and then came back to face the consequences.      SERVICES PROVIDED: Our services included assessment, orientation, treatment planning, and education regarding problem gambling, group and family therapy, education, relapse prevention and discharge planning.     ISSUES ADDRESSED IN TREATMENT:   Lack of healthy coping skills, relationship with wife, stress due to job, financial strain, legal issues, shame, and guilt around gambling abuse, lack of understanding of problem gambling and relapse prevention.    DIMENSION 1 - ACUTE INTOXICATION/WITHDRAWAL POTENTIAL  ADMISSION RISK RATIN  DISCHARGE RISK RATIN  Patient did not appear to be under the influence or in withdrawal  throughout the course of the program.    DIMENSION 2 - BIOMEDICAL COMPLICATIONS AND CONDITIONS  ADMISSION RISK RATIN  DISCHARGE RISK RATIN  Patient did not identify any major medical issues or concerns during treatment and was able to locate medical resources when needed.    DIMENSION 3 - EMOTIONAL, BEHAVIORAL, COGNITIVE CONDITIONS AND COMPLICATIONS  ADMISSION RISK RATIN  DISCHARGE RISK RATIN  The patient had never had treatment for problem gambling.  Patient completed assignments that increased his awareness of how problem gambling had affected his life, and the life of those around him.  He was introduced to meditation to help decrease stress and keep the mind from having running thoughts.  He completed other assignments working to increase his communication skills, decrease his shame, and gain a better understanding of how his emotions impacted his addiction.  Patient is able to recognize assertive communication as a strategy.  Patient continues to identify and appears he is beginning to utilize these strategies effectively. He watched videos pertaining to gambling as a disease, consequences of gambling, and strengthening recovery.  Patient was exposed to personality testing in the Enneagram Personality Inventory.  This allowed the patient to be aware of his strengths and weakness within his personality, and areas for growth. The patient did not report any history of mental illness and did not take any medication during treatment.  Patient denied suicidal and self-injurious ideation and intent during treatment. Patient completed a Patient Safety Plan to be used for self-harming thoughts or for urges.   Patient had denied a history of trauma and/or abuse.  During treatment, he presented as being open and vulnerable with his feelings in group.  Of primary concern is that as he progressed through treatment, he was able to resolve some of the shame that he felt due to the gambling and thefts.  He  reported being able to come to peace with self and family members.    DIMENSION 4 - READINESS FOR CHANGE  ADMISSION RISK RATIN  DISCHARGE RISK RATIN  Upon admission, patient reported his willingness to follow treatment recommendations with active reinforcement.  Patient reported his family as reinforcement.  While in programming, patient participated adequately in programming and completed assignments as directed in his treatment plan.  Patient had not gambled since last year but was incarcerated for six of those months and has been active in the court process which is a strong deterrent not to [place].  Patient completed weekly check in sheet on how he was doing in treatment, completed assignments on strengthening his commitment to change, and setting goals in recovery including maintaining healthy eating, sleeping, and work habits.  These showed a strengthening throughout treatment to his commitment for long term recovery.  He completed assignments on errors in thinking and regaining the values he lost, he also listened to a daily reading on virtues and how to practice them.  Patient became aware of his need for social interactions outside his marriage.  Patient quit drinking and smoking during treatment, which could decrease his relapse potential.  He participated in discussions about relapse prevention.    DIMENSION 5 - RELAPSE, CONTINUED USE AND CONTINUED PROBLEM POTENTIAL   ADMISSION RISK RATIN  DISCHARGE RISK RATIN  Patient has consistently demonstrated sincere motivation for being gambling free and reaching out to get the help he needs.  Patient appears to have gained insight into understanding the relationship between his gambling and his access to money.   Patient has increased his social connections, gained insight into gambling as an addiction, and the need for recovery support.  He has completed assignments on understanding his emotional connection to money, and attended a session with  [facility] on available credit counseling.  He completed assignments on relapse prevention and has implemented these skills into his daily living. Patient was made aware of cross addiction and he quit smoking and drinking while attending treatment.    DIMENSION 6 - RECOVERY ENVIRONMENT  ADMISSION RISK RATING: 3  DISCHARGE RISK RATIN  Patient currently lives with his wife.  His wife and his extended family have supported him in his recovery. Patient has worked on paying off debt and continues to work full time.  Patient has wife managing the family funds.  He has strengthened his relationship with his wife, and she was active in the family program.  The patient attended GA meetings while in programming.  Patient completed assignments on finding purpose, finding sober support, and finding balance in life.  Patient reported his willingness to follow Continuing Care Plan.      PROGNOSIS:  Favorable.    STRENGTHS:  The patient identified strengths as being strong willed and having a readiness to learn.    LIVING ARRANGEMENTS AT DISCHARGE:   Mr. Nava lives with his wife.      CONTINUING CARE RECOMMENDATIONS:     See Continuing Care Plan for a Successful Long Term Recovery    This information has been disclosed to you from records protected by Federal confidentiality rules (42 CFR part 2). The Federal rules prohibit you from making any further disclosure of this information unless further disclosure is expressly permitted by the written consent of the person to whom it pertains or as otherwise permitted by 42 CFR part 2. A general authorization for the release of medical or other information is NOT sufficient for this purpose. The Federal rules restrict any use of the information to criminally investigate or prosecute any alcohol or drug abuse patient.     FEI ABURTO MS, [facility], [facility]

## 2019-08-13 ENCOUNTER — MYC MEDICAL ADVICE (OUTPATIENT)
Dept: FAMILY MEDICINE | Facility: CLINIC | Age: 44
End: 2019-08-13